# Patient Record
Sex: FEMALE | Race: BLACK OR AFRICAN AMERICAN | Employment: FULL TIME | URBAN - METROPOLITAN AREA
[De-identification: names, ages, dates, MRNs, and addresses within clinical notes are randomized per-mention and may not be internally consistent; named-entity substitution may affect disease eponyms.]

---

## 2018-12-25 ENCOUNTER — HOSPITAL ENCOUNTER (EMERGENCY)
Age: 53
Discharge: HOME | End: 2018-12-25
Attending: EMERGENCY MEDICINE
Payer: OTHER GOVERNMENT

## 2018-12-25 ENCOUNTER — APPOINTMENT (OUTPATIENT)
Dept: GENERAL RADIOLOGY | Age: 53
End: 2018-12-25
Attending: EMERGENCY MEDICINE
Payer: OTHER GOVERNMENT

## 2018-12-25 VITALS
HEIGHT: 65 IN | OXYGEN SATURATION: 100 % | BODY MASS INDEX: 30.82 KG/M2 | TEMPERATURE: 97.9 F | RESPIRATION RATE: 10 BRPM | SYSTOLIC BLOOD PRESSURE: 142 MMHG | HEART RATE: 88 BPM | WEIGHT: 185 LBS | DIASTOLIC BLOOD PRESSURE: 73 MMHG

## 2018-12-25 DIAGNOSIS — M54.2 NECK PAIN: Primary | ICD-10-CM

## 2018-12-25 DIAGNOSIS — R07.9 CHEST PAIN, UNSPECIFIED TYPE: ICD-10-CM

## 2018-12-25 LAB
ALBUMIN SERPL-MCNC: 4 G/DL (ref 3.4–5)
ALBUMIN/GLOB SERPL: 0.9 {RATIO} (ref 0.8–1.7)
ALP SERPL-CCNC: 129 U/L (ref 45–117)
ALT SERPL-CCNC: 35 U/L (ref 13–56)
ANION GAP SERPL CALC-SCNC: 13 MMOL/L (ref 3–18)
AST SERPL-CCNC: 25 U/L (ref 15–37)
ATRIAL RATE: 104 BPM
BASOPHILS # BLD: 0 K/UL (ref 0–0.1)
BASOPHILS NFR BLD: 0 % (ref 0–2)
BILIRUB SERPL-MCNC: 0.5 MG/DL (ref 0.2–1)
BUN SERPL-MCNC: 12 MG/DL (ref 7–18)
BUN/CREAT SERPL: 13 (ref 12–20)
CALCIUM SERPL-MCNC: 9.7 MG/DL (ref 8.5–10.1)
CALCULATED P AXIS, ECG09: 67 DEGREES
CALCULATED R AXIS, ECG10: 13 DEGREES
CALCULATED T AXIS, ECG11: 44 DEGREES
CHLORIDE SERPL-SCNC: 103 MMOL/L (ref 100–108)
CK MB CFR SERPL CALC: 0.9 % (ref 0–4)
CK MB SERPL-MCNC: 1.9 NG/ML (ref 5–25)
CK SERPL-CCNC: 219 U/L (ref 26–192)
CO2 SERPL-SCNC: 26 MMOL/L (ref 21–32)
CREAT SERPL-MCNC: 0.92 MG/DL (ref 0.6–1.3)
DIAGNOSIS, 93000: NORMAL
DIFFERENTIAL METHOD BLD: ABNORMAL
EOSINOPHIL # BLD: 0.1 K/UL (ref 0–0.4)
EOSINOPHIL NFR BLD: 1 % (ref 0–5)
ERYTHROCYTE [DISTWIDTH] IN BLOOD BY AUTOMATED COUNT: 15.1 % (ref 11.6–14.5)
GLOBULIN SER CALC-MCNC: 4.4 G/DL (ref 2–4)
GLUCOSE SERPL-MCNC: 107 MG/DL (ref 74–99)
HCT VFR BLD AUTO: 35.1 % (ref 35–45)
HGB BLD-MCNC: 12.2 G/DL (ref 12–16)
LYMPHOCYTES # BLD: 4.9 K/UL (ref 0.9–3.6)
LYMPHOCYTES NFR BLD: 49 % (ref 21–52)
MCH RBC QN AUTO: 26.3 PG (ref 24–34)
MCHC RBC AUTO-ENTMCNC: 34.8 G/DL (ref 31–37)
MCV RBC AUTO: 75.6 FL (ref 74–97)
MONOCYTES # BLD: 0.7 K/UL (ref 0.05–1.2)
MONOCYTES NFR BLD: 7 % (ref 3–10)
NEUTS SEG # BLD: 4.2 K/UL (ref 1.8–8)
NEUTS SEG NFR BLD: 43 % (ref 40–73)
P-R INTERVAL, ECG05: 182 MS
PLATELET # BLD AUTO: 370 K/UL (ref 135–420)
PMV BLD AUTO: 9 FL (ref 9.2–11.8)
POTASSIUM SERPL-SCNC: 3.4 MMOL/L (ref 3.5–5.5)
PROT SERPL-MCNC: 8.4 G/DL (ref 6.4–8.2)
Q-T INTERVAL, ECG07: 348 MS
QRS DURATION, ECG06: 64 MS
QTC CALCULATION (BEZET), ECG08: 457 MS
RBC # BLD AUTO: 4.64 M/UL (ref 4.2–5.3)
SODIUM SERPL-SCNC: 142 MMOL/L (ref 136–145)
TROPONIN I SERPL-MCNC: <0.02 NG/ML (ref 0–0.06)
VENTRICULAR RATE, ECG03: 104 BPM
WBC # BLD AUTO: 9.9 K/UL (ref 4.6–13.2)

## 2018-12-25 PROCEDURE — 80053 COMPREHEN METABOLIC PANEL: CPT

## 2018-12-25 PROCEDURE — 77030029684 HC NEB SM VOL KT MONA -A

## 2018-12-25 PROCEDURE — 99283 EMERGENCY DEPT VISIT LOW MDM: CPT

## 2018-12-25 PROCEDURE — 74011000250 HC RX REV CODE- 250: Performed by: EMERGENCY MEDICINE

## 2018-12-25 PROCEDURE — 82553 CREATINE MB FRACTION: CPT

## 2018-12-25 PROCEDURE — 96374 THER/PROPH/DIAG INJ IV PUSH: CPT

## 2018-12-25 PROCEDURE — 94640 AIRWAY INHALATION TREATMENT: CPT

## 2018-12-25 PROCEDURE — 71045 X-RAY EXAM CHEST 1 VIEW: CPT

## 2018-12-25 PROCEDURE — 85025 COMPLETE CBC W/AUTO DIFF WBC: CPT

## 2018-12-25 PROCEDURE — 93005 ELECTROCARDIOGRAM TRACING: CPT

## 2018-12-25 PROCEDURE — 74011250636 HC RX REV CODE- 250/636: Performed by: EMERGENCY MEDICINE

## 2018-12-25 RX ORDER — IPRATROPIUM BROMIDE AND ALBUTEROL SULFATE 2.5; .5 MG/3ML; MG/3ML
3 SOLUTION RESPIRATORY (INHALATION) ONCE
Status: COMPLETED | OUTPATIENT
Start: 2018-12-25 | End: 2018-12-25

## 2018-12-25 RX ADMIN — IPRATROPIUM BROMIDE AND ALBUTEROL SULFATE 3 ML: .5; 3 SOLUTION RESPIRATORY (INHALATION) at 05:14

## 2018-12-25 RX ADMIN — METHYLPREDNISOLONE SODIUM SUCCINATE 125 MG: 125 INJECTION, POWDER, FOR SOLUTION INTRAMUSCULAR; INTRAVENOUS at 05:14

## 2018-12-25 NOTE — ED PROVIDER NOTES
EMERGENCY DEPARTMENT HISTORY AND PHYSICAL EXAM    5:13 AM      Date: 12/25/2018  Patient Name: Wanda Wagner    History of Presenting Illness     Chief Complaint   Patient presents with    Shortness of Breath    Neck Pain    Back Pain     upper    Chest Pain         History Provided By: Patient and Patient's     Chief Complaint: SOB  Duration:  Minutes  Timing:  Progressive  Location: neck and upper back   Quality: Aching  Severity: 8 out of 10  Modifying Factors: None   Associated Symptoms: denies any other associated signs or symptoms      Additional History (Context): Wanda Wagner is a 47 y.o. female with asthma who presents with SOB and left neck and left shoulder pain. Patient denies nausea, vomiting or diarrhea. Pt denies smoking, etoh or recreational drug use. PCP: Unknown, Provider        Past History     Past Medical History:  History reviewed. No pertinent past medical history. Past Surgical History:  History reviewed. No pertinent surgical history. Family History:  History reviewed. No pertinent family history. Social History:  Social History     Tobacco Use    Smoking status: Not on file   Substance Use Topics    Alcohol use: Not on file    Drug use: Not on file       Allergies: Allergies   Allergen Reactions    Iodine And Iodide Containing Products Unknown (comments)         Review of Systems       Review of Systems   Constitutional: Negative. Negative for chills, diaphoresis and fever. HENT: Negative for congestion, rhinorrhea and sore throat. Eyes: Negative. Negative for pain, discharge and redness. Respiratory: Positive for chest tightness, shortness of breath, wheezing and stridor. Negative for cough. Cardiovascular: Negative. Negative for chest pain. Gastrointestinal: Negative. Negative for abdominal pain, constipation, diarrhea, nausea and vomiting. Genitourinary: Negative. Negative for dysuria, flank pain, frequency, hematuria and urgency. Musculoskeletal: Negative. Negative for back pain and neck pain. Skin: Negative. Negative for rash. Neurological: Negative. Negative for syncope, weakness, numbness and headaches. Psychiatric/Behavioral: Negative. All other systems reviewed and are negative. Physical Exam     Visit Vitals  /73   Pulse 88   Temp 97.9 °F (36.6 °C)   Resp 10   Ht 5' 5\" (1.651 m)   Wt 83.9 kg (185 lb)   SpO2 100%   BMI 30.79 kg/m²         Physical Exam   Constitutional: She appears well-developed and well-nourished. Non-toxic appearance. She does not have a sickly appearance. She does not appear ill. No distress. HENT:   Head: Normocephalic and atraumatic. Mouth/Throat: Oropharynx is clear and moist. No oropharyngeal exudate. Eyes: Conjunctivae and EOM are normal. Pupils are equal, round, and reactive to light. No scleral icterus. Neck: Normal range of motion. Neck supple. No hepatojugular reflux and no JVD present. No tracheal deviation present. No thyromegaly present. Cardiovascular: Normal rate, regular rhythm, S1 normal, S2 normal, normal heart sounds, intact distal pulses and normal pulses. Exam reveals no gallop, no S3 and no S4. No murmur heard. Pulses:       Radial pulses are 2+ on the right side, and 2+ on the left side. Dorsalis pedis pulses are 2+ on the right side, and 2+ on the left side. Pulmonary/Chest: Breath sounds normal. Accessory muscle usage present. Tachypnea noted. No respiratory distress. She has no wheezes. She has no rhonchi. She has no rales. Forced upper airway stridor  No wheezing appreciated on exam   Abdominal: Soft. Normal appearance and bowel sounds are normal. She exhibits no distension and no mass. There is no hepatosplenomegaly. There is no tenderness. There is no rigidity, no rebound, no guarding, no CVA tenderness, no tenderness at McBurney's point and negative Segovia's sign. Musculoskeletal: Normal range of motion.    Strength 5/5 throughout Lymphadenopathy:        Head (right side): No submental, no submandibular, no preauricular and no occipital adenopathy present. Head (left side): No submental, no submandibular, no preauricular and no occipital adenopathy present. She has no cervical adenopathy. Right: No supraclavicular adenopathy present. Left: No supraclavicular adenopathy present. Neurological: She is alert. She has normal strength and normal reflexes. She is not disoriented. No cranial nerve deficit or sensory deficit. Coordination and gait normal. GCS eye subscore is 4. GCS verbal subscore is 5. GCS motor subscore is 6. Grossly intact    Skin: Skin is warm, dry and intact. No rash noted. She is not diaphoretic. Psychiatric: Her speech is normal and behavior is normal. Judgment and thought content normal. Her mood appears anxious. Cognition and memory are normal.   Nursing note and vitals reviewed.         Diagnostic Study Results     Labs -  Recent Results (from the past 12 hour(s))   EKG, 12 LEAD, INITIAL    Collection Time: 12/25/18  5:12 AM   Result Value Ref Range    Ventricular Rate 104 BPM    Atrial Rate 104 BPM    P-R Interval 182 ms    QRS Duration 64 ms    Q-T Interval 348 ms    QTC Calculation (Bezet) 457 ms    Calculated P Axis 67 degrees    Calculated R Axis 13 degrees    Calculated T Axis 44 degrees    Diagnosis       Sinus tachycardia  Possible Left atrial enlargement  Septal infarct , age undetermined  Abnormal ECG  No previous ECGs available     CBC WITH AUTOMATED DIFF    Collection Time: 12/25/18  5:14 AM   Result Value Ref Range    WBC 9.9 4.6 - 13.2 K/uL    RBC 4.64 4.20 - 5.30 M/uL    HGB 12.2 12.0 - 16.0 g/dL    HCT 35.1 35.0 - 45.0 %    MCV 75.6 74.0 - 97.0 FL    MCH 26.3 24.0 - 34.0 PG    MCHC 34.8 31.0 - 37.0 g/dL    RDW 15.1 (H) 11.6 - 14.5 %    PLATELET 981 418 - 379 K/uL    MPV 9.0 (L) 9.2 - 11.8 FL    NEUTROPHILS 43 40 - 73 %    LYMPHOCYTES 49 21 - 52 %    MONOCYTES 7 3 - 10 % EOSINOPHILS 1 0 - 5 %    BASOPHILS 0 0 - 2 %    ABS. NEUTROPHILS 4.2 1.8 - 8.0 K/UL    ABS. LYMPHOCYTES 4.9 (H) 0.9 - 3.6 K/UL    ABS. MONOCYTES 0.7 0.05 - 1.2 K/UL    ABS. EOSINOPHILS 0.1 0.0 - 0.4 K/UL    ABS. BASOPHILS 0.0 0.0 - 0.1 K/UL    DF AUTOMATED     METABOLIC PANEL, COMPREHENSIVE    Collection Time: 12/25/18  5:14 AM   Result Value Ref Range    Sodium 142 136 - 145 mmol/L    Potassium 3.4 (L) 3.5 - 5.5 mmol/L    Chloride 103 100 - 108 mmol/L    CO2 26 21 - 32 mmol/L    Anion gap 13 3.0 - 18 mmol/L    Glucose 107 (H) 74 - 99 mg/dL    BUN PENDING MG/DL    Creatinine 0.92 0.6 - 1.3 MG/DL    BUN/Creatinine ratio PENDING     GFR est AA >60 >60 ml/min/1.73m2    GFR est non-AA >60 >60 ml/min/1.73m2    Calcium 9.7 8.5 - 10.1 MG/DL    Bilirubin, total 0.5 0.2 - 1.0 MG/DL    ALT (SGPT) 35 13 - 56 U/L    AST (SGOT) 25 15 - 37 U/L    Alk.  phosphatase 129 (H) 45 - 117 U/L    Protein, total 8.4 (H) 6.4 - 8.2 g/dL    Albumin 4.0 3.4 - 5.0 g/dL    Globulin 4.4 (H) 2.0 - 4.0 g/dL    A-G Ratio 0.9 0.8 - 1.7     CARDIAC PANEL,(CK, CKMB & TROPONIN)    Collection Time: 12/25/18  5:14 AM   Result Value Ref Range     (H) 26 - 192 U/L    CK - MB 1.9 <3.6 ng/ml    CK-MB Index 0.9 0.0 - 4.0 %    Troponin-I, QT <0.02 0.00 - 0.06 NG/ML       Radiologic Studies -   XR CHEST PORT    (Results Pending)         Medical Decision Making   Provider Notes (Medical Decision Making):  MDM  Number of Diagnoses or Management Options  Chest pain, unspecified type:   Neck pain:   Diagnosis management comments: DIFFERENTIAL DIAGNOSES/ MEDICAL DECISION MAKING:  Chest pain etiologies include acute cardiac events to include possible acute myocardial infarction, acute coronary syndrome, pneumonia, chest wall pain (myofascial/ musculoskeletal etiology), chronic obstructive pulmonary disease (copd), acute asthma exacerbation, congestive heart failure, acute bronchitis, pulmonary embolism, upper respiratory infection, referred abdominal pain, other etiologies, versus combination of the above. I am the first provider for this patient. I reviewed the vital signs, available nursing notes, past medical history, past surgical history, family history and social history. Vital Signs-Reviewed the patient's vital signs. Records Reviewed: Nursing Notes (Time of Review: 5:13 AM)    ED Course: Progress Notes, Reevaluation, and Consults:    Labs essentially normal. Chest X-Ray showed No acute process. EKG showed sinus tachycardia with rate of 104 bpm. With no ST elevations or depression and non specific T wave changes. 5:13 AM 12/25/2018      Diagnosis       I have reassessed the patient. Patient is feeling much better. Patient will not be prescribed medication. Patient was discharged in stable condition. Patient is to return to emergency department if any new or worsening condition. Clinical Impression:   1. Neck pain    2. Chest pain, unspecified type        Disposition: Home     Follow-up Information     Follow up With Specialties Details Why [address]  In 2 days  [facility]  [phone]           _______________________________    Attestations:  Andre Dolan 7, DO acting as a scribe for and in the presence of Mic Minor DO      December 25, 2018 at [facility]       Provider Attestation:      I personally performed the services described in the documentation, reviewed the documentation, as recorded by the scribe in my presence, and it accurately and completely records my words and actions.  December 25, 2018 at 6:02 AM - Nora Burger DO    _______________________________

## 2018-12-25 NOTE — ED NOTES
I have reviewed discharge instructions with the patient. The patient verbalized understanding. Patient armband removed and shredded    Patient Discharged in stable condition. Patient is awake, alert and oriented x 4. Denies any pain/discomfort, denies any SOB.

## 2018-12-25 NOTE — DISCHARGE INSTRUCTIONS
Back Pain: Care Instructions  Your Care Instructions    Back pain has many possible causes. It is often related to problems with muscles and ligaments of the back. It may also be related to problems with the nerves, discs, or bones of the back. Moving, lifting, standing, sitting, or sleeping in an awkward way can strain the back. Sometimes you don't notice the injury until later. Arthritis is another common cause of back pain. Although it may hurt a lot, back pain usually improves on its own within several weeks. Most people recover in 12 weeks or less. Using good home treatment and being careful not to stress your back can help you feel better sooner. Follow-up care is a key part of your treatment and safety. Be sure to make and go to all appointments, and call your doctor if you are having problems. It's also a good idea to know your test results and keep a list of the medicines you take. How can you care for yourself at home? · Sit or lie in positions that are most comfortable and reduce your pain. Try one of these positions when you lie down:  ? Lie on your back with your knees bent and supported by large pillows. ? Lie on the floor with your legs on the seat of a sofa or chair. ? Lie on your side with your knees and hips bent and a pillow between your legs. ? Lie on your stomach if it does not make pain worse. · Do not sit up in bed, and avoid soft couches and twisted positions. Bed rest can help relieve pain at first, but it delays healing. Avoid bed rest after the first day of back pain. · Change positions every 30 minutes. If you must sit for long periods of time, take breaks from sitting. Get up and walk around, or lie in a comfortable position. · Try using a heating pad on a low or medium setting for 15 to 20 minutes every 2 or 3 hours. Try a warm shower in place of one session with the heating pad. · You can also try an ice pack for 10 to 15 minutes every 2 to 3 hours.  Put a thin cloth between the ice pack and your skin. · Take pain medicines exactly as directed. ? If the doctor gave you a prescription medicine for pain, take it as prescribed. ? If you are not taking a prescription pain medicine, ask your doctor if you can take an over-the-counter medicine. · Take short walks several times a day. You can start with 5 to 10 minutes, 3 or 4 times a day, and work up to longer walks. Walk on level surfaces and avoid hills and stairs until your back is better. · Return to work and other activities as soon as you can. Continued rest without activity is usually not good for your back. · To prevent future back pain, do exercises to stretch and strengthen your back and stomach. Learn how to use good posture, safe lifting techniques, and proper body mechanics. When should you call for help? Call your doctor now or seek immediate medical care if:    · You have new or worsening numbness in your legs.     · You have new or worsening weakness in your legs. (This could make it hard to stand up.)     · You lose control of your bladder or bowels.    Watch closely for changes in your health, and be sure to contact your doctor if:    · You have a fever, lose weight, or don't feel well.     · You do not get better as expected. Where can you learn more? Go to http://olive-ten.info/. Enter W373 in the search box to learn more about \"Back Pain: Care Instructions. \"  Current as of: November 29, 2017  Content Version: 11.8  © 3453-7062 [employer]. Care instructions adapted under license by [employer] (which disclaims liability or warranty for this information). If you have questions about a medical condition or this instruction, always ask your healthcare professional. [address] any warranty or liability for your use of this information. MyChart Activation    Thank you for requesting access to [employer].  Please follow the instructions below to securely access and download your online medical record. [employer] allows you to send messages to your doctor, view your test results, renew your prescriptions, schedule appointments, and more. How Do I Sign Up? 1. In your internet browser, go to [email or web address]  2. Click on the First Time User? Click Here link in the Sign In box. You will be redirect to the New Member Sign Up page. 3. Enter your [employer] Access Code exactly as it appears below. You will not need to use this code after youve completed the sign-up process. If you do not sign up before the expiration date, you must request a new code. [employer] Access Code: [email or web address]  Expires: 3/25/2019  5:03 AM (This is the date your [employer] access code will )    4. Enter the last four digits of your Social Security Number (xxxx) and Date of Birth (mm/dd/yyyy) as indicated and click Submit. You will be taken to the next sign-up page. 5. Create a [employer] ID. This will be your [employer] login ID and cannot be changed, so think of one that is secure and easy to remember. 6. Create a [employer] password. You can change your password at any time. 7. Enter your Password Reset Question and Answer. This can be used at a later time if you forget your password. 8. Enter your e-mail address. You will receive e-mail notification when new information is available in [address] 9. Click Sign Up. You can now view and download portions of your medical record. 10. Click the Download Summary menu link to download a portable copy of your medical information. Additional Information    If you have questions, please visit the Frequently Asked Questions section of the [employer] website at [email or web address] Remember, [employer] is NOT to be used for urgent needs. For medical emergencies, dial 911.